# Patient Record
Sex: FEMALE | Race: AMERICAN INDIAN OR ALASKA NATIVE | URBAN - METROPOLITAN AREA
[De-identification: names, ages, dates, MRNs, and addresses within clinical notes are randomized per-mention and may not be internally consistent; named-entity substitution may affect disease eponyms.]

---

## 2019-09-17 ENCOUNTER — OFFICE VISIT (OUTPATIENT)
Dept: URBAN - METROPOLITAN AREA CLINIC 23 | Facility: CLINIC | Age: 84
End: 2019-09-17
Payer: MEDICARE

## 2019-09-17 DIAGNOSIS — H10.45 OTHER CHRONIC ALLERGIC CONJUNCTIVITIS: Primary | ICD-10-CM

## 2019-09-17 DIAGNOSIS — H04.123 DRY EYE SYNDROME OF BILATERAL LACRIMAL GLANDS: ICD-10-CM

## 2019-09-17 DIAGNOSIS — H26.493 OTHER SECONDARY CATARACT, BILATERAL: ICD-10-CM

## 2019-09-17 DIAGNOSIS — E11.9 TYPE 2 DIABETES MELLITUS W/O COMPLICATION: ICD-10-CM

## 2019-09-17 PROCEDURE — 92002 INTRM OPH EXAM NEW PATIENT: CPT | Performed by: OPTOMETRIST

## 2019-09-17 RX ORDER — PREDNISOLONE ACETATE 10 MG/ML
1 % SUSPENSION/ DROPS OPHTHALMIC
Qty: 5 | Refills: 0 | Status: INACTIVE
Start: 2019-09-17 | End: 2019-09-30

## 2019-09-17 RX ORDER — OLOPATADINE HYDROCHLORIDE 1 MG/ML
0.1 % SOLUTION/ DROPS OPHTHALMIC
Qty: 1 | Refills: 11 | Status: ACTIVE
Start: 2019-09-17

## 2019-09-17 ASSESSMENT — KERATOMETRY: OS: 40.75

## 2019-09-17 ASSESSMENT — INTRAOCULAR PRESSURE
OD: 10
OS: 10

## 2019-09-17 NOTE — IMPRESSION/PLAN
Impression: Dry eye syndrome of bilateral lacrimal glands: H04.123. Plan: Discussed diagnosis with patient. Recommend artificial tears during the day for comfort.

## 2019-09-17 NOTE — IMPRESSION/PLAN
Impression: Type 2 diabetes mellitus w/o complication: S61.7. Plan: Discussed findings. Pt not dilated. No BDR/CSDME seen OS. Unable to evaluate OD due to PCO. Recommend pt return for DM exam after YAG OU.

## 2019-09-17 NOTE — IMPRESSION/PLAN
Impression: Other secondary cataract, bilateral: H26.493. Plan: Discussed diagnosis with patient. Recommend YAG OD then OS with cataract surgeon next available.

## 2019-09-17 NOTE — IMPRESSION/PLAN
Impression: Other chronic allergic conjunctivitis: H10.45. Plan: Patient educated that symptoms are caused by ocular allergies and that treatment will help alleviate symptoms but that it will not prevent allergies. Patient educated that allergy testing with an allergy specialist may be necessary to identify allergens affecting patient and treat condition. Prescribed Prednisolone acetate 1% QID OU x10 days, then start olopatadine BID OU for maintenance. May use Opcon A OTC for ocular itch.

## 2019-09-30 ENCOUNTER — OFFICE VISIT (OUTPATIENT)
Dept: URBAN - METROPOLITAN AREA CLINIC 23 | Facility: CLINIC | Age: 84
End: 2019-09-30
Payer: MEDICARE

## 2019-09-30 DIAGNOSIS — H35.373 PUCKERING OF MACULA, BILATERAL: Primary | ICD-10-CM

## 2019-09-30 DIAGNOSIS — H43.822 VITREOMACULAR TRACTION OF LT EYE: ICD-10-CM

## 2019-09-30 DIAGNOSIS — H26.491 OTHER SECONDARY CATARACT, RIGHT EYE: ICD-10-CM

## 2019-09-30 PROCEDURE — 92134 CPTRZ OPH DX IMG PST SGM RTA: CPT | Performed by: OPHTHALMOLOGY

## 2019-09-30 PROCEDURE — 92004 COMPRE OPH EXAM NEW PT 1/>: CPT | Performed by: OPHTHALMOLOGY

## 2019-09-30 RX ORDER — PREDNISOLONE ACETATE 10 MG/ML
1 % SUSPENSION/ DROPS OPHTHALMIC
Qty: 10 | Refills: 5 | Status: INACTIVE
Start: 2019-09-30 | End: 2020-11-10

## 2019-09-30 RX ORDER — OFLOXACIN 3 MG/ML
0.3 % SOLUTION/ DROPS OPHTHALMIC
Qty: 5 | Refills: 3 | Status: INACTIVE
Start: 2019-09-30 | End: 2020-11-10

## 2019-09-30 ASSESSMENT — INTRAOCULAR PRESSURE
OD: 13
OS: 13

## 2019-09-30 NOTE — IMPRESSION/PLAN
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 OS. Vision: vision affected. Plan: Discussed diagnosis in detail with patient. No treatment is required at this time. Will continue to observe condition and or symptoms. Will need surgery OS in the future.

## 2019-09-30 NOTE — IMPRESSION/PLAN
Impression: Other secondary cataract, right eye: H26.491. OD. Vision: vision affected. Plan: Discussed diagnosis in detail with patient. Will remove PCO during retina surgery - SEE NOTES ABOVE.

## 2019-09-30 NOTE — IMPRESSION/PLAN
Impression: Puckering of macula, bilateral: H35.373. OD >> OS. Condition: unstable. Vision: vision affected. Plan: Discussed diagnosis in detail with patient. Discussed risks of progression. Surgical treatment is recommended to remove scar tissue PPVx RIGHT EYE and remove PCO OD for vision improvement. Surgical risks and benefits were discussed, explained and understood by patient. All questions answered. RL1. Patient elects to proceed with recommendation. OCT performed today: ERM OD and OS shows VMT with foveal traction broad base . Educational material provided to patient. Erx Prednisolone 1% OD QID and Ofloxacin OD QID  x 1 wk only - sent to patient's pharmacy. Will need surgery OS in the future.

## 2019-11-12 ENCOUNTER — SURGERY (OUTPATIENT)
Dept: URBAN - METROPOLITAN AREA SURGERY 11 | Facility: SURGERY | Age: 84
End: 2019-11-12
Payer: MEDICARE

## 2019-11-12 PROCEDURE — 67042 VIT FOR MACULAR HOLE: CPT | Performed by: OPHTHALMOLOGY

## 2019-11-13 ENCOUNTER — POST-OPERATIVE VISIT (OUTPATIENT)
Dept: URBAN - METROPOLITAN AREA CLINIC 23 | Facility: CLINIC | Age: 84
End: 2019-11-13

## 2019-11-13 DIAGNOSIS — Z09 ENCNTR FOR F/U EXAM AFT TRTMT FOR COND OTH THAN MALIG NEOPLM: Primary | ICD-10-CM

## 2019-11-13 ASSESSMENT — INTRAOCULAR PRESSURE
OD: 14
OS: 14

## 2019-11-19 ENCOUNTER — POST-OPERATIVE VISIT (OUTPATIENT)
Dept: URBAN - METROPOLITAN AREA CLINIC 23 | Facility: CLINIC | Age: 84
End: 2019-11-19

## 2019-11-19 PROCEDURE — 99024 POSTOP FOLLOW-UP VISIT: CPT | Performed by: OPHTHALMOLOGY

## 2019-11-19 ASSESSMENT — INTRAOCULAR PRESSURE
OS: 14
OD: 15

## 2020-10-07 ENCOUNTER — OFFICE VISIT (OUTPATIENT)
Dept: URBAN - METROPOLITAN AREA CLINIC 23 | Facility: CLINIC | Age: 85
End: 2020-10-07
Payer: MEDICARE

## 2020-10-07 DIAGNOSIS — E11.3293 TYPE 2 DIAB W MILD NONPRLF DIABETIC RTNOP W/O MACULAR EDEMA, BILATERAL: Primary | ICD-10-CM

## 2020-10-07 DIAGNOSIS — H26.492 OTHER SECONDARY CATARACT, LEFT EYE: ICD-10-CM

## 2020-10-07 PROCEDURE — 92014 COMPRE OPH EXAM EST PT 1/>: CPT | Performed by: OPTOMETRIST

## 2020-10-07 ASSESSMENT — INTRAOCULAR PRESSURE
OS: 24
OD: 24

## 2020-10-07 ASSESSMENT — KERATOMETRY: OS: 40.25

## 2020-10-07 NOTE — IMPRESSION/PLAN
Impression: Type 2 diab w mild nonprlf diabetic rtnop w/o macular edema, bilateral: L70.6231. Plan: Diabetes type II: mild background diabetic retinopathy, no signs of neovascularization noted. No treatment necessary at this time. Patient was instructed to monitor vision for sudden changes and to call if visual changes noted. Discussed ocular and systemic benefits of blood sugar control. Return in one year for a dilated eye exam with Dr. Sal Herrera.

## 2020-10-07 NOTE — IMPRESSION/PLAN
Impression: Other secondary cataract, left eye: H26.492. Plan: Discussed findings. Recommend YAG OS.  Refraction after YAG with [employer]

## 2020-11-04 ENCOUNTER — SURGERY (OUTPATIENT)
Dept: URBAN - METROPOLITAN AREA SURGERY 11 | Facility: SURGERY | Age: 85
End: 2020-11-04
Payer: MEDICARE

## 2020-11-04 PROCEDURE — 66821 AFTER CATARACT LASER SURGERY: CPT | Performed by: OPHTHALMOLOGY

## 2020-11-10 ENCOUNTER — POST-OPERATIVE VISIT (OUTPATIENT)
Dept: URBAN - METROPOLITAN AREA CLINIC 23 | Facility: CLINIC | Age: 85
End: 2020-11-10

## 2020-11-10 DIAGNOSIS — Z48.810 ENCOUNTER FOR SURGICAL AFTERCARE FOLLOWING SURGERY ON A SENSE ORGAN: Primary | ICD-10-CM

## 2020-11-10 PROCEDURE — 99024 POSTOP FOLLOW-UP VISIT: CPT | Performed by: OPTOMETRIST

## 2020-11-10 ASSESSMENT — INTRAOCULAR PRESSURE
OD: 19
OS: 19

## 2020-11-10 NOTE — IMPRESSION/PLAN
Impression: S/P YAG PC OS - 6 Days. Encounter for surgical aftercare following surgery on a sense organ  Z48.810. Plan: Use drops as directed above. Return if sudden vision change or increasing pain.   Discussed macular traction OS